# Patient Record
Sex: FEMALE | Race: WHITE | URBAN - METROPOLITAN AREA
[De-identification: names, ages, dates, MRNs, and addresses within clinical notes are randomized per-mention and may not be internally consistent; named-entity substitution may affect disease eponyms.]

---

## 2017-03-27 ENCOUNTER — APPOINTMENT (RX ONLY)
Dept: URBAN - METROPOLITAN AREA CLINIC 161 | Facility: CLINIC | Age: 67
Setting detail: DERMATOLOGY
End: 2017-03-27

## 2017-03-27 DIAGNOSIS — D36.1 BENIGN NEOPLASM OF PERIPHERAL NERVES AND AUTONOMIC NERVOUS SYSTEM: ICD-10-CM

## 2017-03-27 DIAGNOSIS — L57.0 ACTINIC KERATOSIS: ICD-10-CM

## 2017-03-27 DIAGNOSIS — Z85.828 PERSONAL HISTORY OF OTHER MALIGNANT NEOPLASM OF SKIN: ICD-10-CM

## 2017-03-27 DIAGNOSIS — L82.1 OTHER SEBORRHEIC KERATOSIS: ICD-10-CM

## 2017-03-27 DIAGNOSIS — L81.4 OTHER MELANIN HYPERPIGMENTATION: ICD-10-CM

## 2017-03-27 PROBLEM — K21.9 GASTRO-ESOPHAGEAL REFLUX DISEASE WITHOUT ESOPHAGITIS: Status: ACTIVE | Noted: 2017-03-27

## 2017-03-27 PROBLEM — D36.12 BENIGN NEOPLASM OF PERIPHERAL NERVES AND AUTONOMIC NERVOUS SYSTEM, UPPER LIMB, INCLUDING SHOULDER: Status: ACTIVE | Noted: 2017-03-27

## 2017-03-27 PROBLEM — L85.3 XEROSIS CUTIS: Status: ACTIVE | Noted: 2017-03-27

## 2017-03-27 PROBLEM — I10 ESSENTIAL (PRIMARY) HYPERTENSION: Status: ACTIVE | Noted: 2017-03-27

## 2017-03-27 PROBLEM — F32.9 MAJOR DEPRESSIVE DISORDER, SINGLE EPISODE, UNSPECIFIED: Status: ACTIVE | Noted: 2017-03-27

## 2017-03-27 PROBLEM — C44.722 SQUAMOUS CELL CARCINOMA OF SKIN OF RIGHT LOWER LIMB, INCLUDING HIP: Status: ACTIVE | Noted: 2017-03-27

## 2017-03-27 PROCEDURE — 17000 DESTRUCT PREMALG LESION: CPT

## 2017-03-27 PROCEDURE — ? COUNSELING

## 2017-03-27 PROCEDURE — ? PUNCH EXCISION

## 2017-03-27 PROCEDURE — ? PATIENT SPECIFIC COUNSELING

## 2017-03-27 PROCEDURE — 11601 EXC TR-EXT MAL+MARG 0.6-1 CM: CPT | Mod: 59

## 2017-03-27 PROCEDURE — ? OBSERVATION

## 2017-03-27 PROCEDURE — 17003 DESTRUCT PREMALG LES 2-14: CPT

## 2017-03-27 PROCEDURE — ? LIQUID NITROGEN

## 2017-03-27 PROCEDURE — 99213 OFFICE O/P EST LOW 20 MIN: CPT | Mod: 25

## 2017-03-27 ASSESSMENT — LOCATION SIMPLE DESCRIPTION DERM
LOCATION SIMPLE: RIGHT EAR
LOCATION SIMPLE: LEFT FOREARM
LOCATION SIMPLE: LEFT THIGH
LOCATION SIMPLE: CHEST
LOCATION SIMPLE: LEFT CHEEK
LOCATION SIMPLE: RIGHT HAND
LOCATION SIMPLE: UPPER BACK
LOCATION SIMPLE: RIGHT FOREARM
LOCATION SIMPLE: LEFT POSTERIOR UPPER ARM
LOCATION SIMPLE: LEFT PRETIBIAL REGION
LOCATION SIMPLE: RIGHT POPLITEAL SKIN
LOCATION SIMPLE: LEFT ELBOW
LOCATION SIMPLE: RIGHT PRETIBIAL REGION
LOCATION SIMPLE: RIGHT CALF
LOCATION SIMPLE: LEFT CALF

## 2017-03-27 ASSESSMENT — LOCATION DETAILED DESCRIPTION DERM
LOCATION DETAILED: LEFT MEDIAL ELBOW
LOCATION DETAILED: LEFT PROXIMAL PRETIBIAL REGION
LOCATION DETAILED: LEFT ANTERIOR DISTAL THIGH
LOCATION DETAILED: LEFT DISTAL CALF
LOCATION DETAILED: RIGHT RADIAL DORSAL HAND
LOCATION DETAILED: RIGHT DISTAL CALF
LOCATION DETAILED: LEFT PROXIMAL POSTERIOR UPPER ARM
LOCATION DETAILED: LEFT CENTRAL MALAR CHEEK
LOCATION DETAILED: RIGHT POPLITEAL SKIN
LOCATION DETAILED: RIGHT PROXIMAL RADIAL DORSAL FOREARM
LOCATION DETAILED: RIGHT PROXIMAL PRETIBIAL REGION
LOCATION DETAILED: SUPERIOR THORACIC SPINE
LOCATION DETAILED: LEFT MEDIAL SUPERIOR CHEST
LOCATION DETAILED: RIGHT DISTAL DORSAL FOREARM
LOCATION DETAILED: RIGHT ULNAR DORSAL HAND
LOCATION DETAILED: LEFT LATERAL PROXIMAL PRETIBIAL REGION
LOCATION DETAILED: RIGHT INFERIOR HELIX
LOCATION DETAILED: RIGHT DISTAL PRETIBIAL REGION
LOCATION DETAILED: LEFT LATERAL DISTAL PRETIBIAL REGION
LOCATION DETAILED: LEFT PROXIMAL DORSAL FOREARM

## 2017-03-27 ASSESSMENT — LOCATION ZONE DERM
LOCATION ZONE: HAND
LOCATION ZONE: FACE
LOCATION ZONE: ARM
LOCATION ZONE: TRUNK
LOCATION ZONE: LEG
LOCATION ZONE: EAR

## 2017-03-27 NOTE — PROCEDURE: PUNCH EXCISION
Bill 42655 For Specimen Handling/Conveyance To Laboratory?: no
4.5 Mm Punch Excision Text: A 4.5 mm punch biopsy was used to excise the lesion to the level of the subcutaneous fat.  Blunt dissection was used to free the lesion from the surrounding tissues and the lesion was removed.
Epidermal Sutures: 4-0 Nylon
Notification Instructions: Patient will be notified of biopsy results. However, patient instructed to call the office if not contacted within 2 weeks.
Consent was obtained from the patient. The risks and benefits to therapy were discussed in detail. Specifically, the risks of infection, scarring, bleeding, prolonged wound healing, incomplete removal, allergy to anesthesia, nerve injury and recurrence were addressed. Prior to the procedure, the treatment site was clearly identified and confirmed by the patient. All components of Universal Protocol/PAUSE Rule completed.
X Depth Of Punch In Cm (Optional): 0
Wound Dressings: a bandage
1.5 Mm Punch Excision Text: A 1.5 mm punch biopsy was used to excise the lesion to the level of the subcutaneous fat.  Blunt dissection was used to free the lesion from the surrounding tissues and the lesion was removed.
Intermediate / Complex Repair - Final Wound Length In Cm: 1.3
Deep Sutures: 4-0 Vicryl
Epidermal Closure: simple interrupted
Size Of Lesion: 0.5
Anesthesia Volume In Cc: 2
Billing Type: Third-Party Bill
2.5 Mm Punch Excision Text: A 2.5 mm punch biopsy was used to excise the lesion to the level of the subcutaneous fat.  Blunt dissection was used to free the lesion from the surrounding tissues and the lesion was removed.
2 Mm Punch Excision Text: A 2 mm punch biopsy was used to excise the lesion to the level of the subcutaneous fat.  Blunt dissection was used to free the lesion from the surrounding tissues and the lesion was removed.
Hemostasis: Electrocautery
Include Undermining Statement In The Repair Note?: Yes
12 Mm Punch Excision Text: A 12 mm punch biopsy was used to excise the lesion to the level of the subcutaneous fat.  Blunt dissection was used to free the lesion from the surrounding tissues and the lesion was removed.
4 Mm Punch Excision Text: A 4 mm punch biopsy was used to excise the lesion to the level of the subcutaneous fat.  Blunt dissection was used to free the lesion from the surrounding tissues and the lesion was removed.
8 Mm Punch Excision Text: A 8 mm punch biopsy was used to excise the lesion to the level of the subcutaneous fat.  Blunt dissection was used to free the lesion from the surrounding tissues and the lesion was removed.
Anesthesia Type: 1% lidocaine with epinephrine
3.5 Mm Punch Excision Text: A 3.5 mm punch biopsy was used to excise the lesion to the level of the subcutaneous fat.  Blunt dissection was used to free the lesion from the surrounding tissues and the lesion was removed.
Punch Size In Mm: 10
Path Notes (To The Dermatopathologist): Please check margins. Refer back to [id number].
Detail Level: Detailed
Size Of Margin In Cm: 0.25
10 Mm Punch Excision Text: A 10 mm punch biopsy was used to excise the lesion to the level of the subcutaneous fat.  Blunt dissection was used to free the lesion from the surrounding tissues and the lesion was removed.
Lab Facility: [id number]
Number Of Epidermal Sutures (Optional): 3
Repair Type: None (Simple)
Lab: [id number]
Suture Removal: 14 days
3 Mm Punch Excision Text: A 3 mm punch biopsy was used to excise the lesion to the level of the subcutaneous fat.  Blunt dissection was used to free the lesion from the surrounding tissues and the lesion was removed.
5 Mm Punch Excision Text: A 5 mm punch biopsy was used to excise the lesion to the level of the subcutaneous fat.  Blunt dissection was used to free the lesion from the surrounding tissues and the lesion was removed.
6 Mm Punch Excision Text: A 6 mm punch biopsy was used to excise the lesion to the level of the subcutaneous fat.  Blunt dissection was used to free the lesion from the surrounding tissues and the lesion was removed.
7 Mm Punch Excision Text: A 7 mm punch biopsy was used to excise the lesion to the level of the subcutaneous fat.  Blunt dissection was used to free the lesion from the surrounding tissues and the lesion was removed.
Post-Care Instructions: I reviewed with the patient in detail post-care instructions. Patient is to keep the biopsy site dry overnight, and then apply bacitracin twice daily until healed. Patient may apply hydrogen peroxide soaks to remove any crusting.
Wound Care: Vaseline

## 2017-03-27 NOTE — PROCEDURE: LIQUID NITROGEN
Duration Of Freeze Thaw-Cycle (Seconds): 0
Detail Level: Detailed
Render Post-Care Instructions In Note?: yes
Consent: The patient's consent was obtained including but not limited to risks of crusting, scabbing, blistering, scarring, darker or lighter pigmentary change, recurrence, incomplete removal and infection.
Post-Care Instructions: I reviewed with the patient in detail post-care instructions. Patient is to wear sunprotection, and avoid picking at any of the treated lesions. Pt may apply Vaseline to crusted or scabbing areas.  Patient made aware that some lesions may take more than one treatment to fully resolve and some lesion may recur.  Patient was instructed to return to the office if lesions are not resolved after 2 months.

## 2017-04-10 ENCOUNTER — APPOINTMENT (RX ONLY)
Dept: URBAN - METROPOLITAN AREA CLINIC 161 | Facility: CLINIC | Age: 67
Setting detail: DERMATOLOGY
End: 2017-04-10

## 2017-04-10 DIAGNOSIS — Z48.02 ENCOUNTER FOR REMOVAL OF SUTURES: ICD-10-CM

## 2017-04-10 PROBLEM — L29.8 OTHER PRURITUS: Status: ACTIVE | Noted: 2017-04-10

## 2017-04-10 PROBLEM — K21.9 GASTRO-ESOPHAGEAL REFLUX DISEASE WITHOUT ESOPHAGITIS: Status: ACTIVE | Noted: 2017-04-10

## 2017-04-10 PROBLEM — F32.9 MAJOR DEPRESSIVE DISORDER, SINGLE EPISODE, UNSPECIFIED: Status: ACTIVE | Noted: 2017-04-10

## 2017-04-10 PROCEDURE — ? SUTURE REMOVAL (GLOBAL PERIOD)

## 2017-04-10 ASSESSMENT — LOCATION ZONE DERM: LOCATION ZONE: LEG

## 2017-04-10 ASSESSMENT — LOCATION SIMPLE DESCRIPTION DERM: LOCATION SIMPLE: RIGHT PRETIBIAL REGION

## 2017-04-10 ASSESSMENT — LOCATION DETAILED DESCRIPTION DERM: LOCATION DETAILED: RIGHT PROXIMAL PRETIBIAL REGION

## 2017-04-10 NOTE — PROCEDURE: SUTURE REMOVAL (GLOBAL PERIOD)
Add 41875 Cpt? (Important Note: In 2017 The Use Of 07247 Is Being Tracked By Cms To Determine Future Global Period Reimbursement For Global Periods): no
Detail Level: Detailed

## 2020-02-14 ENCOUNTER — OFFICE VISIT (OUTPATIENT)
Dept: URBAN - METROPOLITAN AREA CLINIC 52 | Facility: CLINIC | Age: 70
End: 2020-02-14
Payer: MEDICARE

## 2020-02-14 DIAGNOSIS — H53.2 DOUBLE VISION: Primary | ICD-10-CM

## 2020-02-14 DIAGNOSIS — I78.1 NEVUS, NON-NEOPLASTIC: ICD-10-CM

## 2020-02-14 DIAGNOSIS — H18.51 FUCHS' DYSTROPHY: ICD-10-CM

## 2020-02-14 PROCEDURE — 92014 COMPRE OPH EXAM EST PT 1/>: CPT | Performed by: OPHTHALMOLOGY

## 2020-02-14 ASSESSMENT — INTRAOCULAR PRESSURE
OS: 16
OD: 16

## 2020-02-14 NOTE — IMPRESSION/PLAN
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 Plan: Today, pt has NO diplopia in primary gaze, but does in upgaze. Denies difficulty swallowing, or muscle weakness. Pt states Dr. Seble Parra has set up a neurology appt next week. 
COnsideration may be given to:  MRI brain and orbits, with and w/o contrast, and Ach antibodies to look for MG

## 2020-02-14 NOTE — IMPRESSION/PLAN
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 Plan: Discussed diagnosis in detail with patient. Will continue to observe condition and or symptoms.

## 2020-02-14 NOTE — IMPRESSION/PLAN
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 Reassured patient of current condition and treatment. Will continue to observe condition and or symptoms.  Stable

## 2022-09-22 ENCOUNTER — OFFICE VISIT (OUTPATIENT)
Dept: URBAN - METROPOLITAN AREA CLINIC 52 | Facility: CLINIC | Age: 72
End: 2022-09-22
Payer: COMMERCIAL

## 2022-09-22 DIAGNOSIS — H53.2 DOUBLE VISION: ICD-10-CM

## 2022-09-22 DIAGNOSIS — I78.1 NEVUS, NON-NEOPLASTIC: Primary | ICD-10-CM

## 2022-09-22 PROCEDURE — 99214 OFFICE O/P EST MOD 30 MIN: CPT | Performed by: OPHTHALMOLOGY

## 2022-09-22 PROCEDURE — 92015 DETERMINE REFRACTIVE STATE: CPT | Performed by: OPHTHALMOLOGY

## 2022-09-22 ASSESSMENT — INTRAOCULAR PRESSURE
OD: 18
OS: 19
OS: 17

## 2022-09-22 NOTE — IMPRESSION/PLAN
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 Plan: Pt. c/o Shadowing in office, Pt. prefers 0.5 prism diaptors LHT, Discussed that if double vision or shadowing is bothersome then will need MRx with prism added. Pt. expressed no desire to wear glasses at this time.  Prior Neuro exam negative

## 2022-09-22 NOTE — IMPRESSION/PLAN
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 Reassured patient of current condition and treatment. Will continue to observe condition and or symptoms. Stable at this time.

## 2023-09-18 ENCOUNTER — OFFICE VISIT (OUTPATIENT)
Dept: URBAN - METROPOLITAN AREA CLINIC 52 | Facility: CLINIC | Age: 73
End: 2023-09-18
Payer: COMMERCIAL

## 2023-09-18 DIAGNOSIS — H52.4 PRESBYOPIA: ICD-10-CM

## 2023-09-18 DIAGNOSIS — I78.1 NEVUS, NON-NEOPLASTIC: Primary | ICD-10-CM

## 2023-09-18 DIAGNOSIS — H18.513 ENDOTHELIAL CORNEAL DYSTROPHY, BILATERAL: ICD-10-CM

## 2023-09-18 DIAGNOSIS — H53.2 DOUBLE VISION: ICD-10-CM

## 2023-09-18 PROCEDURE — 92015 DETERMINE REFRACTIVE STATE: CPT | Performed by: OPHTHALMOLOGY

## 2023-09-18 PROCEDURE — 99214 OFFICE O/P EST MOD 30 MIN: CPT | Performed by: OPHTHALMOLOGY

## 2023-09-18 ASSESSMENT — INTRAOCULAR PRESSURE
OD: 19
OS: 14
OD: 14
OS: 19